# Patient Record
Sex: MALE | NOT HISPANIC OR LATINO | URBAN - METROPOLITAN AREA
[De-identification: names, ages, dates, MRNs, and addresses within clinical notes are randomized per-mention and may not be internally consistent; named-entity substitution may affect disease eponyms.]

---

## 2018-10-29 ENCOUNTER — OUTPATIENT (OUTPATIENT)
Dept: OUTPATIENT SERVICES | Facility: HOSPITAL | Age: 63
LOS: 1 days | End: 2018-10-29
Payer: COMMERCIAL

## 2018-10-29 VITALS
TEMPERATURE: 98 F | DIASTOLIC BLOOD PRESSURE: 70 MMHG | HEART RATE: 70 BPM | SYSTOLIC BLOOD PRESSURE: 100 MMHG | HEIGHT: 70 IN | WEIGHT: 199.96 LBS | RESPIRATION RATE: 16 BRPM

## 2018-10-29 DIAGNOSIS — M20.22 HALLUX RIGIDUS, LEFT FOOT: ICD-10-CM

## 2018-10-29 DIAGNOSIS — Z98.890 OTHER SPECIFIED POSTPROCEDURAL STATES: Chronic | ICD-10-CM

## 2018-10-29 DIAGNOSIS — I25.10 ATHEROSCLEROTIC HEART DISEASE OF NATIVE CORONARY ARTERY WITHOUT ANGINA PECTORIS: ICD-10-CM

## 2018-10-29 DIAGNOSIS — Z98.61 CORONARY ANGIOPLASTY STATUS: Chronic | ICD-10-CM

## 2018-10-29 DIAGNOSIS — M20.20 HALLUX RIGIDUS, UNSPECIFIED FOOT: ICD-10-CM

## 2018-10-29 LAB
BUN SERPL-MCNC: 19 MG/DL — SIGNIFICANT CHANGE UP (ref 7–23)
CALCIUM SERPL-MCNC: 8.8 MG/DL — SIGNIFICANT CHANGE UP (ref 8.4–10.5)
CHLORIDE SERPL-SCNC: 104 MMOL/L — SIGNIFICANT CHANGE UP (ref 98–107)
CO2 SERPL-SCNC: 26 MMOL/L — SIGNIFICANT CHANGE UP (ref 22–31)
CREAT SERPL-MCNC: 1.06 MG/DL — SIGNIFICANT CHANGE UP (ref 0.5–1.3)
GLUCOSE SERPL-MCNC: 92 MG/DL — SIGNIFICANT CHANGE UP (ref 70–99)
HCT VFR BLD CALC: 43.9 % — SIGNIFICANT CHANGE UP (ref 39–50)
HGB BLD-MCNC: 14.7 G/DL — SIGNIFICANT CHANGE UP (ref 13–17)
MCHC RBC-ENTMCNC: 32 PG — SIGNIFICANT CHANGE UP (ref 27–34)
MCHC RBC-ENTMCNC: 33.5 % — SIGNIFICANT CHANGE UP (ref 32–36)
MCV RBC AUTO: 95.4 FL — SIGNIFICANT CHANGE UP (ref 80–100)
NRBC # FLD: 0 — SIGNIFICANT CHANGE UP
PLATELET # BLD AUTO: 218 K/UL — SIGNIFICANT CHANGE UP (ref 150–400)
PMV BLD: 9.3 FL — SIGNIFICANT CHANGE UP (ref 7–13)
POTASSIUM SERPL-MCNC: 4.7 MMOL/L — SIGNIFICANT CHANGE UP (ref 3.5–5.3)
POTASSIUM SERPL-SCNC: 4.7 MMOL/L — SIGNIFICANT CHANGE UP (ref 3.5–5.3)
RBC # BLD: 4.6 M/UL — SIGNIFICANT CHANGE UP (ref 4.2–5.8)
RBC # FLD: 13.4 % — SIGNIFICANT CHANGE UP (ref 10.3–14.5)
SODIUM SERPL-SCNC: 140 MMOL/L — SIGNIFICANT CHANGE UP (ref 135–145)
WBC # BLD: 5.43 K/UL — SIGNIFICANT CHANGE UP (ref 3.8–10.5)
WBC # FLD AUTO: 5.43 K/UL — SIGNIFICANT CHANGE UP (ref 3.8–10.5)

## 2018-10-29 PROCEDURE — 93010 ELECTROCARDIOGRAM REPORT: CPT

## 2018-10-29 NOTE — H&P PST ADULT - NEGATIVE CARDIOVASCULAR SYMPTOMS
no chest pain/no orthopnea/no claudication/no paroxysmal nocturnal dyspnea/no dyspnea on exertion/no peripheral edema

## 2018-10-29 NOTE — H&P PST ADULT - PROBLEM SELECTOR PLAN 2
pt with 2 coronary stent inserted 9/11/2017  currently on plavix & aspirin.  confirmed with Ernestina in Dr Manzanares office pt to remain on plavix & aspirin.  Pending cardiology eval for recent episodes of palpitations  recent cardiology studies

## 2018-10-29 NOTE — H&P PST ADULT - FAMILY HISTORY
Father  Still living? Yes, Estimated age: Age Unknown  Family history of heart attack, Age at diagnosis: 51-60

## 2018-10-29 NOTE — H&P PST ADULT - NSANTHOSAYNRD_GEN_A_CORE
No. BENJY screening performed.  STOP BANG Legend: 0-2 = LOW Risk; 3-4 = INTERMEDIATE Risk; 5-8 = HIGH Risk

## 2018-10-29 NOTE — H&P PST ADULT - PROBLEM SELECTOR PLAN 1
scheduled right foot joint replacement 1st  metatarsophalangeal joint on 11/12/2018. - confirmed site with Ernestina in Dr Manzanares (surgeon)  office  preop instructions, gi prophylaxis & surgical soap given   pt verbalized understanding

## 2018-10-29 NOTE — H&P PST ADULT - PSH
H/O coronary angioplasty  2 stent 9/11/2017  H/O umbilical hernia repair    History of bowel resection  2012

## 2018-10-29 NOTE — H&P PST ADULT - LYMPHATIC
supraclavicular R/anterior cervical R/posterior cervical R/posterior cervical L/anterior cervical L/supraclavicular L

## 2018-10-29 NOTE — H&P PST ADULT - HISTORY OF PRESENT ILLNESS
64y/o male presents for prop eval for scheduled right foot joint replacement 1st  metatarsophalangeal joint on 11/12/2018.  Pt with c/o right great toe pain that has progressively worsened.

## 2018-11-11 ENCOUNTER — TRANSCRIPTION ENCOUNTER (OUTPATIENT)
Age: 63
End: 2018-11-11

## 2018-11-12 ENCOUNTER — OUTPATIENT (OUTPATIENT)
Dept: OUTPATIENT SERVICES | Facility: HOSPITAL | Age: 63
LOS: 1 days | Discharge: HOME | End: 2018-11-12

## 2018-11-12 VITALS
RESPIRATION RATE: 16 BRPM | SYSTOLIC BLOOD PRESSURE: 95 MMHG | HEART RATE: 78 BPM | OXYGEN SATURATION: 98 % | TEMPERATURE: 98 F | DIASTOLIC BLOOD PRESSURE: 73 MMHG | WEIGHT: 199.96 LBS | HEIGHT: 70 IN

## 2018-11-12 VITALS — HEART RATE: 70 BPM | DIASTOLIC BLOOD PRESSURE: 84 MMHG | SYSTOLIC BLOOD PRESSURE: 116 MMHG | OXYGEN SATURATION: 100 %

## 2018-11-12 DIAGNOSIS — Z98.61 CORONARY ANGIOPLASTY STATUS: Chronic | ICD-10-CM

## 2018-11-12 DIAGNOSIS — Z98.890 OTHER SPECIFIED POSTPROCEDURAL STATES: Chronic | ICD-10-CM

## 2018-11-12 DIAGNOSIS — M20.22 HALLUX RIGIDUS, LEFT FOOT: ICD-10-CM

## 2018-11-12 RX ORDER — ATORVASTATIN CALCIUM 80 MG/1
1 TABLET, FILM COATED ORAL
Qty: 0 | Refills: 0 | COMMUNITY

## 2018-11-12 RX ORDER — ASPIRIN/CALCIUM CARB/MAGNESIUM 324 MG
1 TABLET ORAL
Qty: 0 | Refills: 0 | COMMUNITY

## 2018-11-12 RX ORDER — METOPROLOL TARTRATE 50 MG
1 TABLET ORAL
Qty: 0 | Refills: 0 | COMMUNITY

## 2018-11-12 RX ORDER — CLOPIDOGREL BISULFATE 75 MG/1
1 TABLET, FILM COATED ORAL
Qty: 0 | Refills: 0 | COMMUNITY

## 2018-11-12 NOTE — ASU DISCHARGE PLAN (ADULT/PEDIATRIC). - NOTIFY
Bleeding that does not stop/Pain not relieved by Medications/Numbness, color, or temperature change to extremity/Numbness, tingling/Fever greater than 101/Swelling that continues/Persistent Nausea and Vomiting

## 2018-11-12 NOTE — ASU DISCHARGE PLAN (ADULT/PEDIATRIC). - ASU FOLLOWUP
911 or go to the nearest Emergency Room [facility] ([facility]):

## 2018-11-12 NOTE — ASU DISCHARGE PLAN (ADULT/PEDIATRIC). - ACTIVITY LEVEL
in surgical shoe/quiet play/no sports/gym/elevate extremity/no exercise/no heavy lifting/weight bearing as tolerated

## 2019-09-09 PROBLEM — K57.92 DIVERTICULITIS OF INTESTINE, PART UNSPECIFIED, WITHOUT PERFORATION OR ABSCESS WITHOUT BLEEDING: Chronic | Status: ACTIVE | Noted: 2018-10-29

## 2019-09-09 PROBLEM — Z00.00 ENCOUNTER FOR PREVENTIVE HEALTH EXAMINATION: Status: ACTIVE | Noted: 2019-09-09

## 2019-09-09 PROBLEM — I25.10 ATHEROSCLEROTIC HEART DISEASE OF NATIVE CORONARY ARTERY WITHOUT ANGINA PECTORIS: Chronic | Status: ACTIVE | Noted: 2018-10-29

## 2019-09-09 PROBLEM — J45.909 UNSPECIFIED ASTHMA, UNCOMPLICATED: Chronic | Status: ACTIVE | Noted: 2018-10-29

## 2019-09-13 ENCOUNTER — APPOINTMENT (OUTPATIENT)
Dept: ORTHOPEDIC SURGERY | Facility: CLINIC | Age: 64
End: 2019-09-13
Payer: COMMERCIAL

## 2019-09-13 VITALS
HEART RATE: 79 BPM | WEIGHT: 200 LBS | HEIGHT: 72 IN | SYSTOLIC BLOOD PRESSURE: 121 MMHG | BODY MASS INDEX: 27.09 KG/M2 | DIASTOLIC BLOOD PRESSURE: 82 MMHG

## 2019-09-13 PROCEDURE — 72040 X-RAY EXAM NECK SPINE 2-3 VW: CPT

## 2019-09-13 PROCEDURE — 99214 OFFICE O/P EST MOD 30 MIN: CPT

## 2019-09-13 NOTE — DISCUSSION/SUMMARY
[de-identified] : The patient was informed of his findings.\par \par He was advised that this lower disc spaces are narrowing as compared to prior films from 2016 and that symptoms the patient is currently experiencing a likely related to his right-sided cervical radiculopathy.\par \par Patient was advised on appropriate activity modification. Since he is unable to take NSAIDs due to cardiac condition he will continue exercise acetaminophen. Patient referred to physical therapy.\par \par His symptoms persist in 4 weeks further workup with MRI possible referral to pain management

## 2019-09-13 NOTE — HISTORY OF PRESENT ILLNESS
[Worsening] : worsening [___ wks] : [unfilled] week(s) ago [5] : a current pain level of 5/10 [6] : an average pain level of 6/10 [4] : a minimum pain level of 4/10 [Walking] : walking [7] : a maximum pain level of 7/10 [Sitting] : sitting [Standing] : standing [Lifting] : lifting [Intermit.] : ~He/She~ states the symptoms seem to be intermittent [de-identified] : Pt presents with pain in his right shoulder. He carries a heavy satchel on his right shoulder and is experiencing pain. There is numbness and tingling which radiates to the right hand index and middle fingers.. Pt has cardiac history and is only taking Tylenol There is no know injury. [NSAIDs] : not relieved by nonsteroidal anti-inflammatory drugs [de-identified] : tylenol

## 2019-09-13 NOTE — PHYSICAL EXAM
[de-identified] : Examination of the right shoulder is within normal limits.\par \par Examination of the cervical spine discloses or lies muscle spasm straight did motion to rotation on both left and right sides. Positive compression test\par \par Deep tendon reflexes 1+ upper extremities equal,  strength equal [de-identified] : X-rays taken of the Cervical spine in [facility] and lateral projections disclosed further disc space narrowing at the C5-6 and C6-7 levels

## 2019-10-28 ENCOUNTER — APPOINTMENT (OUTPATIENT)
Dept: ORTHOPEDIC SURGERY | Facility: CLINIC | Age: 64
End: 2019-10-28
Payer: COMMERCIAL

## 2019-10-28 VITALS
WEIGHT: 200 LBS | HEIGHT: 73 IN | HEART RATE: 85 BPM | SYSTOLIC BLOOD PRESSURE: 119 MMHG | BODY MASS INDEX: 26.51 KG/M2 | DIASTOLIC BLOOD PRESSURE: 72 MMHG

## 2019-10-28 PROCEDURE — 99214 OFFICE O/P EST MOD 30 MIN: CPT

## 2019-10-28 NOTE — HISTORY OF PRESENT ILLNESS
[Stable] : stable [___ mths] : [unfilled] month(s) ago [0] : a maximum pain level of 0/10 [Lifting] : lifting [Exercise Regimen] : relieved by exercise regimen [Physical Therapy] : relieved by physical therapy [de-identified] : Pt returns for follow up for his neck and right shoulder pain, pt is continuing with physical therapy. He has minimal tingling to the right middle finger. NO pain meds.

## 2019-10-28 NOTE — DISCUSSION/SUMMARY
[de-identified] : The patient is completing his physical therapy and is no longer taking any specific medication with respect to his symptoms. He is pleased with his results and would like to hold off on any further treatment options at this time given his favorable response.\par \par Followup p.r.n.

## 2019-10-28 NOTE — PHYSICAL EXAM
[de-identified] : Physical examination of the neck and right shoulder disclosed stable nontender range of motion no acute neurovascular deficits noted to the upper extremities.

## 2020-07-21 ENCOUNTER — APPOINTMENT (OUTPATIENT)
Dept: ORTHOPEDIC SURGERY | Facility: CLINIC | Age: 65
End: 2020-07-21
Payer: COMMERCIAL

## 2020-07-21 VITALS
DIASTOLIC BLOOD PRESSURE: 78 MMHG | SYSTOLIC BLOOD PRESSURE: 118 MMHG | HEIGHT: 72 IN | BODY MASS INDEX: 27.09 KG/M2 | WEIGHT: 200 LBS | HEART RATE: 91 BPM | OXYGEN SATURATION: 94 % | TEMPERATURE: 98.2 F

## 2020-07-21 PROCEDURE — 99214 OFFICE O/P EST MOD 30 MIN: CPT

## 2020-07-21 NOTE — HISTORY OF PRESENT ILLNESS
[___ wks] : [unfilled] week(s) ago [Worsening] : worsening [8] : a maximum pain level of 8/10 [Intermit.] : ~He/She~ states the symptoms seem to be intermittent [None] : No relieving factors are noted [de-identified] : lifting [de-identified] : Pt returns for follow up for his neck pain. Pt states approx 2 weeks ago  lifted golf bag felt pain right shoulder.  Pt is right hand dominant, pt is not taking any pain medication. Pt has numbness radiating to the right hand. Pt has had physical therapy in the past which he feels was helpful.

## 2020-07-21 NOTE — PHYSICAL EXAM
[de-identified] : Physical examination of the right shoulder discloses no impingement signs with overhead motion at 170 degrees.  Positive liftoff signs

## 2020-07-21 NOTE — DISCUSSION/SUMMARY
[de-identified] : Patient was doing well up until his golf activity while carrying a heavy bag.  He will be referred back to physical therapy however was cautioned that if there is no improvement in 3 to 4 weeks further work-up will be indicated.

## 2020-08-18 ENCOUNTER — APPOINTMENT (OUTPATIENT)
Dept: ORTHOPEDIC SURGERY | Facility: CLINIC | Age: 65
End: 2020-08-18
Payer: COMMERCIAL

## 2020-08-18 VITALS
DIASTOLIC BLOOD PRESSURE: 73 MMHG | HEIGHT: 72 IN | SYSTOLIC BLOOD PRESSURE: 105 MMHG | WEIGHT: 200 LBS | HEART RATE: 95 BPM | BODY MASS INDEX: 27.09 KG/M2 | OXYGEN SATURATION: 95 %

## 2020-08-18 PROCEDURE — 99214 OFFICE O/P EST MOD 30 MIN: CPT

## 2020-08-18 RX ORDER — TRAMADOL HYDROCHLORIDE 50 MG/1
50 TABLET, COATED ORAL
Qty: 40 | Refills: 2 | Status: ACTIVE | COMMUNITY
Start: 2020-08-18 | End: 1900-01-01

## 2020-08-18 NOTE — HISTORY OF PRESENT ILLNESS
[___ wks] : [unfilled] week(s) ago [Worsening] : worsening [7] : a minimum pain level of 7/10 [8] : a maximum pain level of 8/10 [Constant] : ~He/She~ states the symptoms seem to be constant [None] : No relieving factors are noted [de-identified] : Pt returns for follow up for pain in his neck. Pt states he is still experiencing pain in his right upper arm extending to the elbow. Pt is not taking any pain medication, he has cardiac history.  Pt is right hand dominant. Pt has been attending physical therapy, which may be causing him more discomfort.  [de-identified] : certain movements

## 2020-08-18 NOTE — PHYSICAL EXAM
[de-identified] : Physical examination of the cervical spine discloses generalized trapezius muscle spasm with pain radiating across his right shoulder down his right arm with associated numbness and tingling to the fingers.  Deep tendon reflexes are diminished on the right side\par Examination of the right shoulder is unremarkable

## 2020-08-18 NOTE — DISCUSSION/SUMMARY
[de-identified] : Patient was informed of his findings, he was advised he likely has a cervical radiculopathy and should undergo further work-up.  He will be referred for pain management and undergo an MRI evaluation of the cervical spine.

## 2020-08-22 ENCOUNTER — APPOINTMENT (OUTPATIENT)
Dept: MRI IMAGING | Facility: CLINIC | Age: 65
End: 2020-08-22

## 2020-08-28 ENCOUNTER — APPOINTMENT (OUTPATIENT)
Dept: ORTHOPEDIC SURGERY | Facility: CLINIC | Age: 65
End: 2020-08-28
Payer: COMMERCIAL

## 2020-08-28 ENCOUNTER — APPOINTMENT (OUTPATIENT)
Dept: ORTHOPEDIC SURGERY | Facility: CLINIC | Age: 65
End: 2020-08-28

## 2020-08-28 VITALS
SYSTOLIC BLOOD PRESSURE: 125 MMHG | WEIGHT: 200 LBS | BODY MASS INDEX: 27.09 KG/M2 | OXYGEN SATURATION: 95 % | HEART RATE: 101 BPM | DIASTOLIC BLOOD PRESSURE: 85 MMHG | HEIGHT: 72 IN

## 2020-08-28 PROCEDURE — 99214 OFFICE O/P EST MOD 30 MIN: CPT

## 2020-08-28 PROCEDURE — 72050 X-RAY EXAM NECK SPINE 4/5VWS: CPT

## 2020-08-28 RX ORDER — CYCLOBENZAPRINE HYDROCHLORIDE 10 MG/1
10 TABLET, FILM COATED ORAL
Qty: 30 | Refills: 1 | Status: ACTIVE | COMMUNITY
Start: 2020-08-28 | End: 1900-01-01

## 2020-08-28 RX ORDER — METHYLPREDNISOLONE 4 MG/1
4 TABLET ORAL
Qty: 1 | Refills: 0 | Status: ACTIVE | COMMUNITY
Start: 2020-08-28 | End: 1900-01-01

## 2020-08-28 RX ORDER — OXYCODONE AND ACETAMINOPHEN 10; 325 MG/1; MG/1
10-325 TABLET ORAL 4 TIMES DAILY
Qty: 40 | Refills: 0 | Status: ACTIVE | COMMUNITY
Start: 2020-08-28 | End: 1900-01-01

## 2020-08-28 NOTE — HISTORY OF PRESENT ILLNESS
[de-identified] : 5-year-old male with worsening neck and right arm pain.  The patient states that he had mild to moderate symptoms 6 weeks ago.  He felt like he was hunched over and he had pain that was shooting down his right arm.  He was prescribed physical therapy.  Apparently had a physical therapy session last week he was forced into neck extension and had onset sudden neck and arm pain.  Since that time he is feeling worsening pain behind his shoulders radiating down his posterior arm and into his entire forearm and his index and middle finger.  He denies any balance issues.  He denies any hand dexterity issues except for handwriting which he contributes to pain in his right middle finger and index finger.  It hurts for him to extend his neck.  He feels overall stiff.  He denies any bowel or bladder incontinence.  Upon my entering the room he did have his bilateral hands above his head which she did say made his hand and neck symptoms improved.  This is a constant pain which is achy throbbing shooting and stabbing.  It does feel better with ice.  It hurts worse when he is lying down or sitting for prolonged amount of time.  Of note he is also seen a chiropractor with limited pain relief.

## 2020-08-28 NOTE — PHYSICAL EXAM
[de-identified] : Cervical Physical Exam\par \par Gait -normal\par \par Station -head is slightly forward pitched\par \par Sagittal balance -head is slightly forward pitched\par \par Compensatory mechanism? -None\par \par Horizontal gaze -has to strain to look upward\par \par Heel walk -normal\par \par Toe walk -normal\par \par Reflexes\par Biceps -normal\par Triceps -normal\par Brachioradialis -normal\par Patellar -normal\par Gastroc -normal\par Clonus -none\par \par Napier´s -none\par \par Shoulder Exam -normal\par \par Spurling´s -positive on right\par \par Wrist Pulses -normal\par \par Foot Pulses -normal\par \par Cervical range of motion -globally reduced\par \par Sensation \par C5-T1 sensation intact to light touch bilaterally\par \par L1-S1 sensation intact to light touch bilaterally\par \par Motor\par \par \par 	Deltoid	Biceps	Triceps	WF	WE	IO	\par Right	5/5	5/5	4/5	4/5	4/5	5/5	5/5\par Left	5/5	5/5	5/5	5/5	5/5	5/5	5/5\par \par \par 	IP	Quad	HS	TA	Gastroc	EHL\par Right	5/5	5/5	5/5	5/5	5/5	5/5\par Left	5/5	5/5	5/5	5/5	5/5	5/5\par \par \par  [de-identified] : Cervical radiographs\par - Degenerative disc disease through cervical spine\par - Cervical spondylosis at C6-C7\par - Facet arthropathy\par - Cervical spondylosis appears worse today over C6-C7 as compared to prior exams\par \par Cervical MRI\par -No severe central canal stenosis that I can detect\par No anomalies here that I can detect\par There is a disc herniation at C6-7 and C5-C6.\par Paracentral and foraminal stenosis at C6-C7 and C5-C6 on the right from disc herniations

## 2020-08-28 NOTE — ASSESSMENT
[FreeTextEntry1] : This is a 65-year-old male that presents today with worsening cervical radiculopathy.  He appears to have had mild to moderate symptoms prior to physical therapy session where he had neck extension of his neck and subsequent worsening of his cervical radicular symptoms.  He appears very uncomfortable on exam today.  He is also complaining of cervical radicular symptoms precisely in the area where there is disc herniations at C5-6 and C6-C7.  He has really only been having the symptoms for 5 to 6 weeks and I think a trial of nonoperative modalities of care is at least reasonable.  Patient is in agreement with this.  We will give him a steroid Dosepak to help relieve some of his cervical radicular symptoms.  I would also like him to get diagnostic/therapeutic selective nerve root block at C6 and C7.  These will be right-sided injections.  Appropriate referrals were made for the patient.  I would like to see him back in 2 weeks for repeat clinical evaluation and to ensure that he is progressing well from his symptoms.  He knows to call back sooner if he develops any worsening balance issues weakness severe pain or any other neurologic symptoms.  The patient was in agreement with this plan

## 2020-09-02 ENCOUNTER — APPOINTMENT (OUTPATIENT)
Dept: ORTHOPEDIC SURGERY | Facility: CLINIC | Age: 65
End: 2020-09-02

## 2020-09-03 ENCOUNTER — APPOINTMENT (OUTPATIENT)
Dept: PHYSICAL MEDICINE AND REHAB | Facility: CLINIC | Age: 65
End: 2020-09-03
Payer: COMMERCIAL

## 2020-09-03 VITALS
HEART RATE: 78 BPM | SYSTOLIC BLOOD PRESSURE: 123 MMHG | TEMPERATURE: 97.2 F | DIASTOLIC BLOOD PRESSURE: 84 MMHG | OXYGEN SATURATION: 98 %

## 2020-09-03 DIAGNOSIS — M25.511 PAIN IN RIGHT SHOULDER: ICD-10-CM

## 2020-09-03 DIAGNOSIS — M75.81 OTHER SHOULDER LESIONS, RIGHT SHOULDER: ICD-10-CM

## 2020-09-03 PROCEDURE — 20605 DRAIN/INJ JOINT/BURSA W/O US: CPT | Mod: RT

## 2020-09-03 PROCEDURE — 99204 OFFICE O/P NEW MOD 45 MIN: CPT | Mod: 25

## 2020-09-06 PROBLEM — M75.81 TENDINITIS OF RIGHT ROTATOR CUFF: Status: ACTIVE | Noted: 2020-07-21

## 2020-09-06 PROBLEM — M25.511 SHOULDER PAIN, RIGHT: Status: ACTIVE | Noted: 2019-09-13

## 2020-09-06 NOTE — PROCEDURE
[de-identified] : PROCEDURE: Ultrasound guided lateral epicondylitis injection\par \par Elbow - RIGHT\par \par -Verbal informed consent was obtained after the potential benefits and side effects of the procedure were explained to the patient, and afterwards an opportunity for questions was provided.  In addition to typical procedure-related side effects, specific possible side effects from the procedure were explained including injury to the ulnar nerves/branches, injury to the ulnar vessels/branches, skin depigmentation/subcutaneous atrophy from corticosteroid, and injury of the extensor tendons.\par -Patient positioning: supine\par -Skin preparation: the overlying skin was prepped with chlorhexidine which was allowed to dry for at least 30 seconds.\par -Visualization of the lateral elbow anatomy was performed with a high frequency ultrasound probe sterilized with PDI wipe prior to application of sterile ultrasound gel.\par -Under direct ultrasound visualization, using the following approach.  A 25 gauge 1.5 inch needle attached to a 5 mL syringe containing injectate described below was directed toward the common extensor tendon under ultrasound guidance.  The injectate described below was then injected just superficial to the common extensor under ultrasound guidance.\par Injectate:\par -3 ml volume (2.75 ml of 0.5% lidocaine and 0.25 ml of Kenalog)\par -Total volume injected: 3 ml\par -Needle position was monitored using both long-axis and short-axis visualization and adjusted as necessary so that paresthesias were not reported during needle placement and aspiration prior to injections were negative for blood return.\par -A band aid was then placed over the needle entry site.\par \par Procedure summary:\par -patient tolerance: excellent\par -Misc. technical comments: none\par \par Recommendations:\par -Ice the area for 20 minutes on- 20 minutes off for up to q1hr prn until seen for follow up visit\par -Limit use of the affected elbow\par -Continue with other aspects of treatment plan as described in prior office note.\par -Contact me with any questions/concerns.\par

## 2020-09-06 NOTE — ASSESSMENT
[FreeTextEntry1] : Patient is a 65M who presents today with right shoulder pain that radiates down the posterior aspect of his right arm and into his 3rd digit, consistent with cervical radiculopthy.\par \par Patient started on steroid dosepak during his last visit with Dr. Kunz and notes improvement in symptoms. Was also taking oxycodone and Flexeril intermittently. Per reports on MRI Cervical neck from 8/28, has anterolisthesis of C4 and C5 with disc bulging. Patient also describes pain in right lateral epicondyle with provocative maneuvers consistent with lateral epicondylitis, as well as symptoms suggestive of possible underlying carpal tunnel's syndrome. \par \par - US guided steroid injection of lateral epicondyle was performed with significant improvement in elbow pain\par - Spoke with Dr. Sparrow briefly about policy of affiliated ASC concerning cervical selective nerve root blocks.  For now will refer patient to Dr. Nieto to consider possible cervical ILESI.  In the interim, will look into obtaining options for cervical selective nerve root blocks.\par - f/u in 2-4 weeks\par \par Gonzales Street MD\par Spine and Sports Medicine\par \par [employer] [facility] [employer]\par At [facility]\par \par

## 2020-09-06 NOTE — HISTORY OF PRESENT ILLNESS
[FreeTextEntry1] : 66yo M who presents with neck pain since carrying his gold bag on his right shoulder approximately 6 weeks prior to encounter. States pain initially started in neck and radiated down posterior aspect of his right arm and into 3rd to 5th digits with numbness and tingling with some subjective weakness. Has experienced this in the past and symptoms improved with PT and chiropractory. However, during PT session 3 weeks prior, experienced worsening pain with extension of his neck during exercise. He was evaluated by Dr. Kunz in the office and was referred to our office for right C6 and C7 selective nerve root block. Currently, patient does not have pain in his neck, but pain starts in his right shoulder blade region and radiated to the posterior right arm and into 3rd digit with numbness and tingling. Patient describes the pain as "achy" and is currently 5/10, which is improved from prior (8-10/10). Symptoms have improved since being started on steroid dosepak, oxycodone, and a "muscle relaxant". MRI cervical spine shows anterolisthesis and degenerative disc disease at C4-C5 and C5-C6 resulting in mild spinal canal stenosis.  Degenerative disc disease with uncovertebral hypertrophy at C6-C7 result in mild spinal canal stenosis and moderate bilateral neuroforaminal stenosis.  Currently, patient's worst pain is in his elbow region and he was seen wringing his hands throughout encounter; states symptoms wake him up at night.\par Patient denies new weakness, numbness or paresthesia.  Denies bowel/bladder dysfunction, fevers, chills, weight loss, night pain, or night sweats.

## 2020-09-06 NOTE — PHYSICAL EXAM
[FreeTextEntry1] : Gen: NAD\par Neck: supple, +spasm lower cervical paraspinals and upper trapezius bilaterally, ROM limited by pain, pos spurling right\par CV: no cyanosis\par Pulm: breathing well on room air\par Abd: soft\par Msk: \par 5/5 hip flexion B/L, 5/5 knee extension B/L, 5/5 knee flexion B/L, 5/5 dorsiflexion B/L, 5/5 plantar flexion B/L\par 5/5 shoulder abduction B/L, 5/5 elbow flexion B/L, 5/5 elbow extension B/L, 5/5 wrist extension B/L, 5/5 hand  B/L\par Neuro: sensation intact to light touch in bilateral upper and lower extremities, reflexes 2+ brachioradialis, biceps, triceps bilaterally, reflexes 2+ patella, medial hamstring, achilles bilaterally, negative babinski, negative napier\par Right elbow: FAROM, (+) TTP over right elbow region,worse at lateral epicondyle. +cozen's, +mill's, neg Napier's b/l. Phalen's, Tinnel's, and compression test negative. No pain with active or passive ROM

## 2020-09-16 ENCOUNTER — APPOINTMENT (OUTPATIENT)
Dept: PHYSICAL MEDICINE AND REHAB | Facility: CLINIC | Age: 65
End: 2020-09-16
Payer: COMMERCIAL

## 2020-09-16 VITALS
TEMPERATURE: 97.6 F | HEART RATE: 80 BPM | SYSTOLIC BLOOD PRESSURE: 127 MMHG | DIASTOLIC BLOOD PRESSURE: 83 MMHG | OXYGEN SATURATION: 98 %

## 2020-09-16 DIAGNOSIS — M48.02 SPINAL STENOSIS, CERVICAL REGION: ICD-10-CM

## 2020-09-16 DIAGNOSIS — M54.12 RADICULOPATHY, CERVICAL REGION: ICD-10-CM

## 2020-09-16 DIAGNOSIS — Z01.818 ENCOUNTER FOR OTHER PREPROCEDURAL EXAMINATION: ICD-10-CM

## 2020-09-16 PROCEDURE — 99214 OFFICE O/P EST MOD 30 MIN: CPT

## 2020-09-16 NOTE — ASSESSMENT
[FreeTextEntry1] : cervical radiculopathy - consistent with MRI findings\par Exhausted conservative management - PT, chiro, medical massage, oral medications (steroid, narcotics, muscle relaxants) - no NSAID 2/2 blood thinners \par reviewed risks/benefits of TAINA\par Discuss with card - needs to be off plavix x 7 days and ASA 81 x 6 days

## 2020-09-16 NOTE — PHYSICAL EXAM
[FreeTextEntry1] : General: NAD, alert and oriented x 3\par Psych: normal mood, intact judgment and insight\par HEENT: NC/AT, normal visual tracking\par Pulmonary: normal respiratory effort, chest expansion appears symmetrical\par CV: extremities appear pink and well perfused\par Abd: non-distended\par Ext: no c/c/e\par Skin: no rashes seen on exposed skin, no visible abrasions\par \par Cervical Spine/Shoulder:\par Inspection: normal muscle bulk without asymmetry\par Palpation: No focal tender points\par ROM: full neck range\par MMT: 5/5 bilateral upper extremities\par Reflexes: symmetric bilateral biceps, triceps, brachioradialis\par Sensory: dec to right radial distribution\par Provocative testing:\par Spurlings positive bilateral\par Lhermitte positive to right\par Napier’s neg \par phalen - positive to right (wrist); elbow flexion neg\par tinel - negative (wrist and elbow)

## 2020-09-16 NOTE — HISTORY OF PRESENT ILLNESS
[FreeTextEntry1] : Mr. HOWIE FLOWERS is a 65 year old male\par hx MI s/p 2 stents 3 years ago - currently on Plavix and ASA 81\par \par Location: right shoulder\par Inciting Event: carrying golf bag on right shoulder \par Onset: acute\par Frequency: intermittent\par Quality: burning, achy\par Severity: 5/10\par Aggravating Factor: neck extension, worse at night - sleeping is difficult, prolong sitting\par Relieving factor: stretches, heat/ice\par Radiation: right elbow to 5th digits, and bilateral trapezius\par Numbness/Tingling: fairly constant to same distribution \par Bowel/Bladder incontinence: denies\par Extremity weakness: difficulty to write and open jars\par \par Prior Treatments\par Injections: right elbow CSI\par PT/OT: completed PT - but with worsening pain\par Chiro + medical massage: x 2 months\par Medications: tried percocet + muscle relaxant, oral steroid (3 weeks ago with temporary benefits)\par

## 2020-09-16 NOTE — DATA REVIEWED
[MRI] : MRI [FreeTextEntry1] : Cervical MRI - [facility]\par Anterolisthesis and DDD at C4/5 C5/6 resulting in mild spinal canal stensos\par DDD uncovertebral hypertrophy at C6/7 result in mild spinal canal stenosis and moderate bilateral neuroforaminal stenosis

## 2020-09-17 ENCOUNTER — APPOINTMENT (OUTPATIENT)
Dept: DISASTER EMERGENCY | Facility: CLINIC | Age: 65
End: 2020-09-17

## 2020-09-18 RX ORDER — METHYLPREDNISOLONE 4 MG/1
4 TABLET ORAL
Qty: 1 | Refills: 0 | Status: ACTIVE | COMMUNITY
Start: 2020-08-28 | End: 1900-01-01

## 2020-09-21 ENCOUNTER — APPOINTMENT (OUTPATIENT)
Dept: ORTHOPEDIC SURGERY | Facility: CLINIC | Age: 65
End: 2020-09-21

## 2020-09-21 LAB — SARS-COV-2 N GENE NPH QL NAA+PROBE: NOT DETECTED

## 2020-09-22 ENCOUNTER — APPOINTMENT (OUTPATIENT)
Dept: PHYSICAL MEDICINE AND REHAB | Facility: CLINIC | Age: 65
End: 2020-09-22

## 2024-12-03 ENCOUNTER — APPOINTMENT (OUTPATIENT)
Dept: ORTHOPEDIC SURGERY | Facility: CLINIC | Age: 69
End: 2024-12-03
Payer: MEDICARE

## 2024-12-03 ENCOUNTER — TRANSCRIPTION ENCOUNTER (OUTPATIENT)
Age: 69
End: 2024-12-03

## 2024-12-03 DIAGNOSIS — M18.0 BILATERAL PRIMARY OSTEOARTHRITIS OF FIRST CARPOMETACARPAL JOINTS: ICD-10-CM

## 2024-12-03 DIAGNOSIS — G56.03 CARPAL TUNNEL SYNDROM,BILATERAL UPPER LIMBS: ICD-10-CM

## 2024-12-03 PROCEDURE — 73130 X-RAY EXAM OF HAND: CPT | Mod: 50

## 2024-12-03 PROCEDURE — 20526 THER INJECTION CARP TUNNEL: CPT | Mod: RT

## 2024-12-03 PROCEDURE — 99203 OFFICE O/P NEW LOW 30 MIN: CPT | Mod: 25
